# Patient Record
Sex: FEMALE | Race: WHITE | NOT HISPANIC OR LATINO | Employment: OTHER | URBAN - METROPOLITAN AREA
[De-identification: names, ages, dates, MRNs, and addresses within clinical notes are randomized per-mention and may not be internally consistent; named-entity substitution may affect disease eponyms.]

---

## 2021-11-16 ENCOUNTER — OFFICE VISIT (OUTPATIENT)
Dept: FAMILY MEDICINE | Facility: CLINIC | Age: 41
End: 2021-11-16
Payer: OTHER GOVERNMENT

## 2021-11-16 VITALS
WEIGHT: 177 LBS | SYSTOLIC BLOOD PRESSURE: 110 MMHG | BODY MASS INDEX: 32.57 KG/M2 | DIASTOLIC BLOOD PRESSURE: 76 MMHG | HEIGHT: 62 IN | HEART RATE: 80 BPM

## 2021-11-16 DIAGNOSIS — Z12.31 ENCOUNTER FOR SCREENING MAMMOGRAM FOR MALIGNANT NEOPLASM OF BREAST: ICD-10-CM

## 2021-11-16 DIAGNOSIS — Z13.29 SCREENING FOR ENDOCRINE DISORDER: ICD-10-CM

## 2021-11-16 DIAGNOSIS — Z00.00 ANNUAL PHYSICAL EXAM: Primary | ICD-10-CM

## 2021-11-16 DIAGNOSIS — Z30.016 ENCOUNTER FOR INITIAL PRESCRIPTION OF TRANSDERMAL PATCH HORMONAL CONTRACEPTIVE DEVICE: ICD-10-CM

## 2021-11-16 DIAGNOSIS — Z13.0 SCREENING FOR DEFICIENCY ANEMIA: ICD-10-CM

## 2021-11-16 DIAGNOSIS — Z76.89 ESTABLISHING CARE WITH NEW DOCTOR, ENCOUNTER FOR: Primary | ICD-10-CM

## 2021-11-16 DIAGNOSIS — Z13.6 SCREENING FOR ISCHEMIC HEART DISEASE (IHD): ICD-10-CM

## 2021-11-16 PROCEDURE — 99203 PR OFFICE/OUTPT VISIT, NEW, LEVL III, 30-44 MIN: ICD-10-PCS | Mod: S$GLB,,, | Performed by: FAMILY MEDICINE

## 2021-11-16 PROCEDURE — 99203 OFFICE O/P NEW LOW 30 MIN: CPT | Mod: S$GLB,,, | Performed by: FAMILY MEDICINE

## 2021-11-16 RX ORDER — NORELGESTROMIN AND ETHINYL ESTRADIOL 35; 150 UG/MG; UG/MG
1 PATCH TRANSDERMAL WEEKLY
Qty: 3 PATCH | Refills: 7 | Status: SHIPPED | OUTPATIENT
Start: 2021-11-16 | End: 2021-12-13 | Stop reason: SDUPTHER

## 2021-11-16 RX ORDER — NORELGESTROMIN AND ETHINYL ESTRADIOL 35; 150 UG/MG; UG/MG
1 PATCH TRANSDERMAL WEEKLY
COMMUNITY
End: 2021-11-16 | Stop reason: SDUPTHER

## 2021-12-01 ENCOUNTER — HOSPITAL ENCOUNTER (OUTPATIENT)
Dept: RADIOLOGY | Facility: HOSPITAL | Age: 41
Discharge: HOME | End: 2021-12-01
Attending: FAMILY MEDICINE
Payer: OTHER GOVERNMENT

## 2021-12-01 DIAGNOSIS — Z12.31 ENCOUNTER FOR SCREENING MAMMOGRAM FOR MALIGNANT NEOPLASM OF BREAST: ICD-10-CM

## 2021-12-01 PROCEDURE — 77067 SCR MAMMO BI INCL CAD: CPT | Mod: TC,PO

## 2021-12-11 DIAGNOSIS — Z30.016 ENCOUNTER FOR INITIAL PRESCRIPTION OF TRANSDERMAL PATCH HORMONAL CONTRACEPTIVE DEVICE: ICD-10-CM

## 2021-12-13 DIAGNOSIS — Z30.016 ENCOUNTER FOR INITIAL PRESCRIPTION OF TRANSDERMAL PATCH HORMONAL CONTRACEPTIVE DEVICE: ICD-10-CM

## 2021-12-13 RX ORDER — NORELGESTROMIN AND ETHINYL ESTRADIOL 35; 150 UG/MG; UG/MG
1 PATCH TRANSDERMAL WEEKLY
Qty: 3 PATCH | Refills: 7 | Status: SHIPPED | OUTPATIENT
Start: 2021-12-13 | End: 2022-02-03 | Stop reason: SDUPTHER

## 2022-01-04 RX ORDER — NORELGESTROMIN AND ETHINYL ESTRADIOL 150; 35 UG/D; UG/D
PATCH TRANSDERMAL
Refills: 0 | OUTPATIENT
Start: 2022-01-04

## 2022-02-03 DIAGNOSIS — Z30.016 ENCOUNTER FOR INITIAL PRESCRIPTION OF TRANSDERMAL PATCH HORMONAL CONTRACEPTIVE DEVICE: ICD-10-CM

## 2022-02-03 RX ORDER — NORELGESTROMIN AND ETHINYL ESTRADIOL 35; 150 UG/MG; UG/MG
1 PATCH TRANSDERMAL WEEKLY
Qty: 3 PATCH | Refills: 7 | Status: SHIPPED | OUTPATIENT
Start: 2022-02-03 | End: 2022-02-07 | Stop reason: SDUPTHER

## 2022-02-03 NOTE — TELEPHONE ENCOUNTER
----- Message from Lizz Carey sent at 2/3/2022  9:22 AM CST -----  Pt calling for refill on her birth control patches to [facility]  # [phone]

## 2022-02-07 DIAGNOSIS — Z30.016 ENCOUNTER FOR INITIAL PRESCRIPTION OF TRANSDERMAL PATCH HORMONAL CONTRACEPTIVE DEVICE: ICD-10-CM

## 2022-02-07 RX ORDER — NORELGESTROMIN AND ETHINYL ESTRADIOL 35; 150 UG/MG; UG/MG
1 PATCH TRANSDERMAL WEEKLY
Qty: 3 PATCH | Refills: 7 | Status: SHIPPED | OUTPATIENT
Start: 2022-02-07 | End: 2022-07-18 | Stop reason: SDUPTHER

## 2022-02-07 NOTE — TELEPHONE ENCOUNTER
----- Message from Lizzmel Carey sent at 2/7/2022 10:04 AM CST -----  Pt callling for Birth Control Patches refill said it was requested last week she can no longer use [facility] due to her  insurance so she needs this this to go to [facility] and will be using [facility] [address] for local fills. I did update the chart.  [phone]

## 2022-05-03 ENCOUNTER — TELEPHONE (OUTPATIENT)
Dept: FAMILY MEDICINE | Facility: CLINIC | Age: 42
End: 2022-05-03

## 2022-05-03 NOTE — TELEPHONE ENCOUNTER
----- Message from Lizz Carey sent at 5/3/2022  8:33 AM CDT -----  Pt calling said she is having left shoulder pain would like to be seen in the afternoon anytime this week [phone]

## 2022-05-04 ENCOUNTER — TELEPHONE (OUTPATIENT)
Dept: FAMILY MEDICINE | Facility: CLINIC | Age: 42
End: 2022-05-04

## 2022-07-18 DIAGNOSIS — Z30.016 ENCOUNTER FOR INITIAL PRESCRIPTION OF TRANSDERMAL PATCH HORMONAL CONTRACEPTIVE DEVICE: ICD-10-CM

## 2022-07-18 RX ORDER — NORELGESTROMIN AND ETHINYL ESTRADIOL 35; 150 UG/MG; UG/MG
1 PATCH TRANSDERMAL WEEKLY
Qty: 9 PATCH | Refills: 3 | Status: SHIPPED | OUTPATIENT
Start: 2022-07-18 | End: 2023-06-29 | Stop reason: SDUPTHER

## 2022-07-18 NOTE — TELEPHONE ENCOUNTER
----- Message from Loni Cornell sent at 7/18/2022 11:30 AM CDT -----  Refill for estradiol patch. Qty 9 patches for 90 days with 3 refills. [facility]. Pt #[phone]

## 2023-03-15 ENCOUNTER — TELEPHONE (OUTPATIENT)
Dept: FAMILY MEDICINE | Facility: CLINIC | Age: 43
End: 2023-03-15

## 2023-03-15 DIAGNOSIS — Z12.31 OTHER SCREENING MAMMOGRAM: ICD-10-CM

## 2023-03-15 NOTE — TELEPHONE ENCOUNTER
----- Message from Lizett Hubbard sent at 3/15/2023  2:42 PM CDT -----  Pt returning a phone call. [phone]     Work Number is [phone] ext [phone] just in case she does not answer cell

## 2023-03-15 NOTE — TELEPHONE ENCOUNTER
----- Message from Lizett Hubbard sent at 3/15/2023 11:32 AM CDT -----  Pt would like to get an appt to discuss weight loss options. [phone]

## 2023-03-22 ENCOUNTER — HOSPITAL ENCOUNTER (OUTPATIENT)
Dept: RADIOLOGY | Facility: CLINIC | Age: 43
Discharge: HOME | End: 2023-03-22
Attending: FAMILY MEDICINE
Payer: OTHER GOVERNMENT

## 2023-03-22 DIAGNOSIS — Z12.31 OTHER SCREENING MAMMOGRAM: ICD-10-CM

## 2023-03-22 PROCEDURE — 77063 MAMMO DIGITAL SCREENING BILAT WITH TOMO: ICD-10-PCS | Mod: 26,,, | Performed by: RADIOLOGY

## 2023-03-22 PROCEDURE — 77063 BREAST TOMOSYNTHESIS BI: CPT | Mod: 26,,, | Performed by: RADIOLOGY

## 2023-03-22 PROCEDURE — 77067 MAMMO DIGITAL SCREENING BILAT WITH TOMO: ICD-10-PCS | Mod: 26,,, | Performed by: RADIOLOGY

## 2023-03-22 PROCEDURE — 77067 SCR MAMMO BI INCL CAD: CPT | Mod: TC,PO

## 2023-03-22 PROCEDURE — 77067 SCR MAMMO BI INCL CAD: CPT | Mod: 26,,, | Performed by: RADIOLOGY

## 2023-04-11 ENCOUNTER — PATIENT MESSAGE (OUTPATIENT)
Dept: ADMINISTRATIVE | Facility: HOSPITAL | Age: 43
End: 2023-04-11
Payer: OTHER GOVERNMENT

## 2023-06-29 DIAGNOSIS — Z30.016 ENCOUNTER FOR INITIAL PRESCRIPTION OF TRANSDERMAL PATCH HORMONAL CONTRACEPTIVE DEVICE: ICD-10-CM

## 2023-06-29 NOTE — TELEPHONE ENCOUNTER
----- Message from Lizett Shea MA sent at 6/29/2023  2:54 PM CDT -----  Regarding: refill  2nd request for ortho evra birth control patch. Please have np or dr. Mitchell approve if dr. Mcfadden is still out.  [facility]  [phone]

## 2023-06-29 NOTE — TELEPHONE ENCOUNTER
Spoke with patient visit scheduled.  Requests refill on patch.  Informed no visit since 2021.  Pending MD approval -NISSA

## 2023-06-30 RX ORDER — NORELGESTROMIN AND ETHINYL ESTRADIOL 35; 150 UG/MG; UG/MG
1 PATCH TRANSDERMAL WEEKLY
Qty: 9 PATCH | Refills: 3 | Status: SHIPPED | OUTPATIENT
Start: 2023-06-30 | End: 2023-11-01 | Stop reason: SDUPTHER

## 2023-06-30 NOTE — TELEPHONE ENCOUNTER
prescription sent to   [facility] - [place], [place] - [address] [place] [id number]  Phone: [phone] Fax: [phone]

## 2023-08-15 ENCOUNTER — TELEPHONE (OUTPATIENT)
Dept: FAMILY MEDICINE | Facility: CLINIC | Age: 43
End: 2023-08-15

## 2023-08-15 NOTE — TELEPHONE ENCOUNTER
----- Message from Viet Fang sent at 8/14/2023 10:39 AM CDT -----  [facility]  [phone]

## 2023-08-17 ENCOUNTER — TELEPHONE (OUTPATIENT)
Dept: FAMILY MEDICINE | Facility: CLINIC | Age: 43
End: 2023-08-17

## 2023-08-17 NOTE — TELEPHONE ENCOUNTER
----- Message from Estela Chandler sent at 8/17/2023  9:47 AM CDT -----  FYI : patient called and stated that she was returning a call to the nurse to reschedule the appointment,contacted the nurse Laura and she advised to put the call through. No patient call back needed for this message,

## 2023-09-28 ENCOUNTER — OCCUPATIONAL HEALTH (OUTPATIENT)
Dept: URGENT CARE | Facility: CLINIC | Age: 43
End: 2023-09-28

## 2023-09-28 PROCEDURE — 80305 DRUG TEST PRSMV DIR OPT OBS: CPT | Mod: S$GLB,,, | Performed by: EMERGENCY MEDICINE

## 2023-09-28 PROCEDURE — 80305 PR COLLECTION ONLY DRUG SCREEN: ICD-10-PCS | Mod: S$GLB,,, | Performed by: EMERGENCY MEDICINE

## 2023-11-01 ENCOUNTER — OFFICE VISIT (OUTPATIENT)
Dept: FAMILY MEDICINE | Facility: CLINIC | Age: 43
End: 2023-11-01
Attending: FAMILY MEDICINE
Payer: OTHER GOVERNMENT

## 2023-11-01 VITALS
BODY MASS INDEX: 30.36 KG/M2 | WEIGHT: 165 LBS | HEART RATE: 66 BPM | SYSTOLIC BLOOD PRESSURE: 122 MMHG | OXYGEN SATURATION: 99 % | HEIGHT: 62 IN | DIASTOLIC BLOOD PRESSURE: 84 MMHG

## 2023-11-01 DIAGNOSIS — Z30.016 ENCOUNTER FOR INITIAL PRESCRIPTION OF TRANSDERMAL PATCH HORMONAL CONTRACEPTIVE DEVICE: ICD-10-CM

## 2023-11-01 DIAGNOSIS — Z01.419 WELL WOMAN EXAM WITH ROUTINE GYNECOLOGICAL EXAM: Primary | ICD-10-CM

## 2023-11-01 PROCEDURE — 99396 PR PREVENTIVE VISIT,EST,40-64: ICD-10-PCS | Mod: S$GLB,,, | Performed by: FAMILY MEDICINE

## 2023-11-01 PROCEDURE — 99396 PREV VISIT EST AGE 40-64: CPT | Mod: S$GLB,,, | Performed by: FAMILY MEDICINE

## 2023-11-01 RX ORDER — NORELGESTROMIN AND ETHINYL ESTRADIOL 35; 150 UG/MG; UG/MG
1 PATCH TRANSDERMAL WEEKLY
Qty: 9 PATCH | Refills: 3 | Status: SHIPPED | OUTPATIENT
Start: 2023-11-01

## 2023-11-01 NOTE — PROGRESS NOTES
SUBJECTIVE:   HPI: Camila Pratt  is a 43 y.o. female who presents for annual physical .   Regular Check Up and declined flu vaccination    Patient presents for her annual exam and cervical cancer screening.  She also continues to use birth control patch for contraception.  Her cycles are regular.  She is currently starting her cycle today.  She has no history of abnormal Pap smears.  She is a G21.  She has a family history of hypertension and diabetes.  No recent surgeries or procedures.  She is up-to-date with dental screening.  She is up-to-date with mammogram.  She will be due colon cancer screening on next year.  She currently declines immunizations      (Not in a hospital admission)    Review of patient's allergies indicates:  No Known Allergies  Current Outpatient Medications on File Prior to Visit   Medication Sig Dispense Refill    [DISCONTINUED] norelgestromin-ethinyl estradiol 150-35 mcg/24 hr Place 1 patch onto the skin once a week. 9 patch 3     No current facility-administered medications on file prior to visit.     Past Medical History:   Diagnosis Date    Anxiety     Arthritis      Past Surgical History:   Procedure Laterality Date     SECTION       Family History   Problem Relation Age of Onset    Fibromyalgia Mother     Diabetes Mellitus Father     Hypertension Father      Social History     Tobacco Use    Smoking status: Never    Smokeless tobacco: Never   Substance Use Topics    Alcohol use: Yes     Comment: occ      Health Maintenance Topics with due status: Not Due       Topic Last Completion Date    Mammogram 2023     Immunization History   Administered Date(s) Administered    Anthrax 2003, 2014, 2018    COVID-19, MRNA, LN-S, PF (MODERNA FULL 0.5 ML DOSE) 2021, 2021, 2021    Encephalitis - IM 2016, 2019    Hepatitis A, Adult 2000, 2003, 2004    Hepatitis B, Adult 2003, 2004, 2005    IPV  "11/08/2000, 11/08/2000    Influenza (Flumist) - Quadrivalent - Intranasal *Preferred* (2-49 years old) 12/07/2013    Influenza - Intranasal - Trivalent 02/19/2008, 10/01/2009, 11/16/2010, 11/17/2010, 10/05/2011, 10/14/2011, 10/04/2012, 11/13/2015    Influenza - Quadrivalent 11/25/2014    Influenza - Quadrivalent - PF *Preferred* (6 months and older) 11/18/2017, 10/14/2018, 12/10/2019, 12/04/2020    Influenza - Trivalent - Recombinant - PF 11/03/2016    Influenza A (H1N1) 2009 Monovalent - IM 01/13/2010    MMR 11/08/2000, 11/02/2018    Meningococcal Conjugate (MCV4P) 11/08/2000    Meningococcal Polysaccharide (23-Valent) (MPSV4) 11/08/2000    PPD Test 11/01/2000, 02/19/2008    Td (ADULT) 11/08/2000, 11/08/2000    Tdap 03/11/2011    Typhoid - ViCPs 12/01/2003, 02/19/2008, 10/18/2012, 10/18/2012, 03/22/2016, 11/02/2018    Yellow Fever 11/08/2000, 11/08/2000, 11/17/2012    vaccinia (smallpox) 02/08/2003, 04/16/2014       Review of Systems   Constitutional:  Negative for activity change, fatigue and unexpected weight change.   HENT:  Negative for hearing loss, postnasal drip, sinus pressure, sore throat and voice change.    Eyes:  Negative for photophobia and visual disturbance.   Respiratory:  Negative for cough, shortness of breath and wheezing.    Cardiovascular:  Negative for chest pain and palpitations.   Gastrointestinal:  Negative for constipation, diarrhea and nausea.   Genitourinary:  Negative for difficulty urinating, frequency, hematuria and urgency.   Musculoskeletal:  Negative for arthralgias and back pain.   Skin:  Negative for rash.   Neurological:  Negative for weakness, light-headedness and headaches.   Hematological:  Negative for adenopathy. Does not bruise/bleed easily.   Psychiatric/Behavioral:  The patient is not nervous/anxious.       OBJECTIVE:      Vitals:    11/01/23 1613   BP: 122/84   Pulse: 66   SpO2: 99%   Weight: 74.8 kg (165 lb)   Height: 5' 2" (1.575 m)     Physical Exam  Vitals reviewed. "   Constitutional:       General: She is not in acute distress.     Appearance: Normal appearance. She is well-developed.   HENT:      Head: Normocephalic and atraumatic.      Right Ear: External ear normal.      Left Ear: External ear normal.      Nose: Nose normal.      Mouth/Throat:      Mouth: Mucous membranes are moist.   Eyes:      General: Lids are normal.      Conjunctiva/sclera: Conjunctivae normal.      Pupils: Pupils are equal, round, and reactive to light.   Neck:      Thyroid: No thyromegaly.      Vascular: No JVD.      Trachea: No tracheal deviation.   Cardiovascular:      Rate and Rhythm: Normal rate and regular rhythm.      Chest Wall: PMI is not displaced.      Pulses: Normal pulses.      Heart sounds: Normal heart sounds.   Pulmonary:      Effort: Pulmonary effort is normal.      Breath sounds: Normal breath sounds.   Chest:   Breasts:     Leonard Score is 5.      Right: Normal.      Left: Normal.   Abdominal:      General: Bowel sounds are normal.      Palpations: Abdomen is soft.      Tenderness: There is no abdominal tenderness. There is no guarding or rebound.   Genitourinary:     Labia:         Right: No rash.         Left: No rash.       Vagina: Normal.      Cervix: Cervical bleeding present.      Uterus: Normal.       Adnexa: Right adnexa normal and left adnexa normal.   Musculoskeletal:         General: No tenderness. Normal range of motion.      Cervical back: Full passive range of motion without pain and neck supple.   Skin:     General: Skin is warm and dry.      Findings: No rash.   Neurological:      General: No focal deficit present.      Mental Status: She is alert and oriented to person, place, and time.   Psychiatric:         Mood and Affect: Mood normal.         Behavior: Behavior normal.        Assessment:       1. Well woman exam with routine gynecological exam    2. Encounter for initial prescription of transdermal patch hormonal contraceptive device        Plan:       Well woman  exam with routine gynecological exam  -     ThinPrep Img Pap/HPV mRNA E6/E7 Huong Leon    Encounter for initial prescription of transdermal patch hormonal contraceptive device  -     norelgestromin-ethinyl estradiol 150-35 mcg/24 hr; Place 1 patch onto the skin once a week.  Dispense: 9 patch; Refill: 3        Counseled on age and gender appropriate medical preventative services, including cancer screenings, immunizations, overall nutritional health, need for a consistent exercise regimen and an overall push towards maintaining a vigorous and active lifestyle.      Follow up in about 1 year (around 11/1/2024) for Annual Physical.

## 2023-11-07 LAB
C TRACH RRNA SPEC QL NAA+PROBE: NOT DETECTED
CLINICAL INFO: NORMAL
COMMENT: NORMAL
COMMENT: NORMAL
CYTO CVX: NORMAL
CYTOLOGIST CVX/VAG CYTO: NORMAL
CYTOLOGY CMNT CVX/VAG CYTO-IMP: NORMAL
DATE OF PREVIOUS PAP: NORMAL
DATE PREVIOUS BX: NORMAL
GEN CATEG CVX/VAG CYTO-IMP: NORMAL
HPV E6+E7 MRNA CVX QL NAA+PROBE: NOT DETECTED
LMP START DATE: NORMAL
N GONORRHOEA RRNA SPEC QL NAA+PROBE: NOT DETECTED
SPECIMEN SOURCE CVX/VAG CYTO: NORMAL
STAT OF ADQ CVX/VAG CYTO-IMP: NORMAL

## 2024-04-17 DIAGNOSIS — Z12.31 OTHER SCREENING MAMMOGRAM: ICD-10-CM

## 2024-04-22 ENCOUNTER — PATIENT MESSAGE (OUTPATIENT)
Dept: ADMINISTRATIVE | Facility: HOSPITAL | Age: 44
End: 2024-04-22
Payer: OTHER GOVERNMENT

## 2024-06-17 ENCOUNTER — OCCUPATIONAL HEALTH (OUTPATIENT)
Dept: URGENT CARE | Facility: CLINIC | Age: 44
End: 2024-06-17

## 2024-06-17 DIAGNOSIS — Z00.00 ROUTINE GENERAL MEDICAL EXAMINATION AT A HEALTH CARE FACILITY: Primary | ICD-10-CM

## 2024-06-17 PROCEDURE — 80305 DRUG TEST PRSMV DIR OPT OBS: CPT | Mod: S$GLB,,, | Performed by: EMERGENCY MEDICINE

## 2024-08-07 ENCOUNTER — PATIENT MESSAGE (OUTPATIENT)
Dept: FAMILY MEDICINE | Facility: CLINIC | Age: 44
End: 2024-08-07
Payer: OTHER GOVERNMENT

## 2024-10-07 ENCOUNTER — HOSPITAL ENCOUNTER (OUTPATIENT)
Dept: RADIOLOGY | Facility: HOSPITAL | Age: 44
Discharge: HOME | End: 2024-10-07
Attending: FAMILY MEDICINE
Payer: OTHER GOVERNMENT

## 2024-10-07 VITALS — HEIGHT: 62 IN | WEIGHT: 165 LBS | BODY MASS INDEX: 30.36 KG/M2

## 2024-10-07 DIAGNOSIS — Z12.31 OTHER SCREENING MAMMOGRAM: ICD-10-CM

## 2024-10-07 PROCEDURE — 77063 BREAST TOMOSYNTHESIS BI: CPT | Mod: 26,,, | Performed by: RADIOLOGY

## 2024-10-07 PROCEDURE — 77067 SCR MAMMO BI INCL CAD: CPT | Mod: 26,,, | Performed by: RADIOLOGY

## 2024-10-07 PROCEDURE — 77067 SCR MAMMO BI INCL CAD: CPT | Mod: TC,PO

## 2025-02-05 ENCOUNTER — OFFICE VISIT (OUTPATIENT)
Dept: FAMILY MEDICINE | Facility: CLINIC | Age: 45
End: 2025-02-05
Payer: OTHER GOVERNMENT

## 2025-02-05 ENCOUNTER — PATIENT MESSAGE (OUTPATIENT)
Dept: GASTROENTEROLOGY | Facility: CLINIC | Age: 45
End: 2025-02-05
Payer: OTHER GOVERNMENT

## 2025-02-05 VITALS
SYSTOLIC BLOOD PRESSURE: 118 MMHG | HEART RATE: 76 BPM | HEIGHT: 62 IN | WEIGHT: 179 LBS | DIASTOLIC BLOOD PRESSURE: 78 MMHG | RESPIRATION RATE: 18 BRPM | OXYGEN SATURATION: 99 % | BODY MASS INDEX: 32.94 KG/M2

## 2025-02-05 DIAGNOSIS — Z13.6 SCREENING FOR ISCHEMIC HEART DISEASE (IHD): ICD-10-CM

## 2025-02-05 DIAGNOSIS — E66.811 CLASS 1 OBESITY DUE TO EXCESS CALORIES WITHOUT SERIOUS COMORBIDITY WITH BODY MASS INDEX (BMI) OF 32.0 TO 32.9 IN ADULT: ICD-10-CM

## 2025-02-05 DIAGNOSIS — Z00.00 ANNUAL PHYSICAL EXAM: Primary | ICD-10-CM

## 2025-02-05 DIAGNOSIS — Z30.016 ENCOUNTER FOR INITIAL PRESCRIPTION OF TRANSDERMAL PATCH HORMONAL CONTRACEPTIVE DEVICE: ICD-10-CM

## 2025-02-05 DIAGNOSIS — Z13.0 SCREENING FOR ENDOCRINE, METABOLIC AND IMMUNITY DISORDER: ICD-10-CM

## 2025-02-05 DIAGNOSIS — Z12.11 COLON CANCER SCREENING: ICD-10-CM

## 2025-02-05 DIAGNOSIS — E66.09 CLASS 1 OBESITY DUE TO EXCESS CALORIES WITHOUT SERIOUS COMORBIDITY WITH BODY MASS INDEX (BMI) OF 32.0 TO 32.9 IN ADULT: ICD-10-CM

## 2025-02-05 DIAGNOSIS — Z13.228 SCREENING FOR ENDOCRINE, METABOLIC AND IMMUNITY DISORDER: ICD-10-CM

## 2025-02-05 DIAGNOSIS — Z13.29 SCREENING FOR ENDOCRINE, METABOLIC AND IMMUNITY DISORDER: ICD-10-CM

## 2025-02-05 PROCEDURE — 99396 PREV VISIT EST AGE 40-64: CPT | Mod: S$GLB,,, | Performed by: FAMILY MEDICINE

## 2025-02-05 RX ORDER — NORELGESTROMIN AND ETHINYL ESTRADIOL 35; 150 UG/MG; UG/MG
1 PATCH TRANSDERMAL WEEKLY
Qty: 9 PATCH | Refills: 3 | Status: SHIPPED | OUTPATIENT
Start: 2025-02-05

## 2025-02-05 NOTE — PROGRESS NOTES
SUBJECTIVE:   HPI: Camila Pratt  is a 45 y.o. female who presents for annual physical .   Annual Exam (No bottles//refills needed// pt states she's concerned of recent weight gain//[facility] )    History of Present Illness    CHIEF COMPLAINT:  Patient presents today for annual visit    PREVENTIVE HEALTH:  Last PAP smear in  was normal. Last mammogram was completed in October. Last eye exam was in 2022. She maintains regular yearly dental visits.    WEIGHT MANAGEMENT:  She expresses difficulty with weight loss despite various attempts and requests comprehensive testing to investigate potential underlying causes.    FAMILY HISTORY:  Family history is positive for diabetes and negative for colon cancer.    MEDICATIONS:  She denies taking any medications.         (Not in a hospital admission)    Review of patient's allergies indicates:  No Known Allergies  Current Outpatient Medications on File Prior to Visit   Medication Sig Dispense Refill    [DISCONTINUED] norelgestromin-ethinyl estradiol 150-35 mcg/24 hr Place 1 patch onto the skin once a week. 9 patch 3     No current facility-administered medications on file prior to visit.     Past Medical History:   Diagnosis Date    Anxiety     Arthritis      Past Surgical History:   Procedure Laterality Date     SECTION       Family History   Problem Relation Name Age of Onset    Fibromyalgia Mother      Diabetes Mellitus Father      Hypertension Father       Social History     Tobacco Use    Smoking status: Never    Smokeless tobacco: Never   Substance Use Topics    Alcohol use: Yes     Comment: occ      Health Maintenance Topics with due status: Not Due       Topic Last Completion Date    Cervical Cancer Screening 2023    Mammogram 10/07/2024    RSV Vaccine (Age 60+ and Pregnant patients) Not Due     Immunization History   Administered Date(s) Administered    Anthrax 2003,  04/16/2014, 11/02/2018    COVID-19, MRNA, LN-S, PF (MODERNA FULL 0.5 ML DOSE) 03/02/2021, 03/30/2021, 12/18/2021    Encephalitis - IM 03/22/2016, 04/24/2019    Hepatitis A, Adult 11/08/2000, 06/05/2003, 08/03/2004    Hepatitis B, Adult 06/05/2003, 08/03/2004, 03/16/2005    IPV 11/08/2000, 11/08/2000    Influenza (Flumist) - Quadrivalent - Intranasal *Preferred* (2-49 years old) 12/07/2013    Influenza - Intranasal 10/14/2011    Influenza - Intranasal - Trivalent 02/19/2008, 10/01/2009, 11/16/2010, 11/17/2010, 10/05/2011, 10/04/2012, 11/13/2015    Influenza - Quadrivalent 11/25/2014    Influenza - Quadrivalent - PF *Preferred* (6 months and older) 11/18/2017, 10/14/2018, 12/10/2019, 12/04/2020    Influenza - Trivalent - Flublok - PF (18 years and older) 11/03/2016    Influenza A (H1N1) 2009 Monovalent - IM 01/13/2010    MMR 11/08/2000, 11/02/2018    Meningococcal Conjugate (MCV4P) 11/08/2000    Meningococcal Polysaccharide (23-Valent) (MPSV4) 11/08/2000    PPD Test 11/01/2000, 02/19/2008    Td (ADULT) 11/08/2000, 11/08/2000    Tdap 03/11/2011    Typhoid - ViCPs 12/01/2003, 02/19/2008, 10/18/2012, 10/18/2012, 03/22/2016, 11/02/2018    Yellow Fever 11/08/2000, 11/08/2000, 11/17/2012    vaccinia (smallpox) 02/08/2003, 04/16/2014       Review of Systems   Constitutional:  Positive for activity change and unexpected weight change.   HENT:  Negative for hearing loss, rhinorrhea and trouble swallowing.    Eyes:  Negative for discharge and visual disturbance.   Respiratory:  Negative for chest tightness and wheezing.    Cardiovascular:  Negative for chest pain and palpitations.   Gastrointestinal:  Negative for blood in stool, constipation, diarrhea and vomiting.   Endocrine: Negative for polydipsia and polyuria.   Genitourinary:  Negative for difficulty urinating, dysuria, hematuria and menstrual problem.   Musculoskeletal:  Negative for arthralgias, joint swelling and neck pain.   Neurological:  Negative for weakness and  "headaches.   Psychiatric/Behavioral:  Negative for confusion and dysphoric mood.       OBJECTIVE:          10/7/2024     7:05 AM 2/5/2025    11:28 AM   Vitals - 1 value per visit   SYSTOLIC  118   DIASTOLIC  78   Pulse  76   Resp  18   SPO2  99 %   Weight (lb) 165 179   Weight (kg) 74.844 81.194   Height 5' 2" (1.575 m) 5' 2" (1.575 m)   BMI (Calculated) 30.2 32.7   Pain Score  Zero      Physical Exam  Constitutional:       Appearance: Normal appearance.   HENT:      Head: Normocephalic and atraumatic.      Mouth/Throat:      Mouth: Mucous membranes are moist.   Eyes:      Conjunctiva/sclera: Conjunctivae normal.   Cardiovascular:      Rate and Rhythm: Normal rate and regular rhythm.      Heart sounds: Normal heart sounds.   Pulmonary:      Effort: Pulmonary effort is normal.      Breath sounds: Normal breath sounds.   Neurological:      General: No focal deficit present.      Mental Status: She is alert and oriented to person, place, and time.   Psychiatric:         Mood and Affect: Mood normal.         Behavior: Behavior normal.          Assessment:       Assessment & Plan    IMPRESSION:  - Conducted annual visit for 45-year-old patient  - Reviewed patient's history, [facility]  - Assessed need for colon cancer screening, considering patient's age and previous screening  - Evaluated birth control needs  - Considered thyroid testing as part of weight loss difficulty investigation    ANNUAL HEALTH ASSESSMENT:  - Conducted annual visit for the patient, reviewing previous care and medical history.  - Noted that the [facility] between [facility].  - Assessed the patient's overall health and need for screenings.  - Recommend annual or biennial visits based on health status.  - Instructed the patient to follow up in 1-2 years for next annual visit if labs are normal.    CERVICAL CANCER SCREENING:  - Confirmed previous PAP test was normal.    LABS:  - Ordered screening labs " including CBC, Metabolic panel, Thyroid function test, cholesterol, and diabetes screen.    WEIGHT MANAGEMENT:  - Noted the patient's report of difficulty in losing weight.  - Acknowledged age-related changes in metabolism affecting weight loss.  - Educated about age-related changes in metabolism and weight loss challenges after 40.    THYROID DISORDER:  - Ordered thyroid test as part of weight loss investigation.    BREAST CANCER SCREENING:  - Confirmed patient had a mammogram in October.  - Noted previous mammogram results were normal.    COLORECTAL CANCER SCREENING:  - Noted patient's report of [facility].  - Explained difference between annual Fecal Immunochemical Test (FIT) and triennial Cologuard for colon cancer screening.  - Discussed colonoscopy as an alternative screening method, effective for 10 years if normal.  - Referred patient for colonoscopy (medically non-urgent) as chosen screening method.  - Instructed patient to contact the office regarding colonoscopy scheduling.    BIRTH CONTROL:  - Continued birth control patches, 1 patch weekly with 1 week off per month.  - Sent prescription for birth control patches to [facility].    DIABETES SCREENING:  - Noted patient's family history of diabetes.  - Ordered diabetes screening as part of lab work.    HYPERLIPIDEMIA SCREENING:  - Ordered lipid panel for cholesterol screening.           Plan:       Annual physical exam  -     Comprehensive Metabolic Panel; Future; Expected date: 02/05/2025  -     CBC Auto Differential; Future; Expected date: 02/05/2025  -     Lipid Panel; Future; Expected date: 02/05/2025  -     Hemoglobin A1C; Future; Expected date: 02/05/2025  -     TSH; Future; Expected date: 02/05/2025    Encounter for initial prescription of transdermal patch hormonal contraceptive device  -     norelgestromin-ethinyl estradiol 150-35 mcg/24 hr; Place 1 patch onto the skin once a week.  Dispense: 9 patch; Refill: 3    Colon  cancer screening  -     Case Request Endoscopy: COLONOSCOPY    Screening for ischemic heart disease (IHD)    Screening for endocrine, metabolic and immunity disorder  -     Comprehensive Metabolic Panel; Future; Expected date: 02/05/2025  -     CBC Auto Differential; Future; Expected date: 02/05/2025  -     Lipid Panel; Future; Expected date: 02/05/2025  -     Hemoglobin A1C; Future; Expected date: 02/05/2025  -     TSH; Future; Expected date: 02/05/2025    Class 1 obesity due to excess calories without serious comorbidity with body mass index (BMI) of 32.0 to 32.9 in adult        Counseled on age and gender appropriate medical preventative services, including cancer screenings, immunizations, overall nutritional health, need for a consistent exercise regimen and an overall push towards maintaining a vigorous and active lifestyle.      Follow up in about 1 year (around 2/5/2026) for Annual Physical.        This note was generated with the assistance of ambient listening technology. Verbal consent was obtained by the patient and accompanying visitor(s) for the recording of patient appointment to facilitate this note. I attest to having reviewed and edited the generated note for accuracy, though some syntax or spelling errors may persist. Please contact the author of this note for any clarification.

## 2025-02-06 LAB
ALBUMIN SERPL-MCNC: 4.4 G/DL (ref 3.6–5.1)
ALBUMIN/GLOB SERPL: 1.5 (CALC) (ref 1–2.5)
ALP SERPL-CCNC: 75 U/L (ref 31–125)
ALT SERPL-CCNC: 13 U/L (ref 6–29)
AST SERPL-CCNC: 13 U/L (ref 10–35)
BASOPHILS # BLD AUTO: 149 CELLS/UL (ref 0–200)
BASOPHILS NFR BLD AUTO: 1.2 %
BILIRUB SERPL-MCNC: 0.3 MG/DL (ref 0.2–1.2)
BUN SERPL-MCNC: 13 MG/DL (ref 7–25)
BUN/CREAT SERPL: NORMAL (CALC) (ref 6–22)
CALCIUM SERPL-MCNC: 9.3 MG/DL (ref 8.6–10.2)
CHLORIDE SERPL-SCNC: 102 MMOL/L (ref 98–110)
CHOLEST SERPL-MCNC: 182 MG/DL
CHOLEST/HDLC SERPL: 2.9 (CALC)
CO2 SERPL-SCNC: 26 MMOL/L (ref 20–32)
CREAT SERPL-MCNC: 0.76 MG/DL (ref 0.5–0.99)
EGFR: 98 ML/MIN/1.73M2
EOSINOPHIL # BLD AUTO: 372 CELLS/UL (ref 15–500)
EOSINOPHIL NFR BLD AUTO: 3 %
ERYTHROCYTE [DISTWIDTH] IN BLOOD BY AUTOMATED COUNT: 11.7 % (ref 11–15)
GLOBULIN SER CALC-MCNC: 3 G/DL (CALC) (ref 1.9–3.7)
GLUCOSE SERPL-MCNC: 77 MG/DL (ref 65–99)
HBA1C MFR BLD: 5.2 % OF TOTAL HGB
HCT VFR BLD AUTO: 39.7 % (ref 35–45)
HDLC SERPL-MCNC: 63 MG/DL
HGB BLD-MCNC: 13.1 G/DL (ref 11.7–15.5)
LDLC SERPL CALC-MCNC: 95 MG/DL (CALC)
LYMPHOCYTES # BLD AUTO: 2703 CELLS/UL (ref 850–3900)
LYMPHOCYTES NFR BLD AUTO: 21.8 %
MCH RBC QN AUTO: 32 PG (ref 27–33)
MCHC RBC AUTO-ENTMCNC: 33 G/DL (ref 32–36)
MCV RBC AUTO: 97.1 FL (ref 80–100)
MONOCYTES # BLD AUTO: 868 CELLS/UL (ref 200–950)
MONOCYTES NFR BLD AUTO: 7 %
NEUTROPHILS # BLD AUTO: 8308 CELLS/UL (ref 1500–7800)
NEUTROPHILS NFR BLD AUTO: 67 %
NONHDLC SERPL-MCNC: 119 MG/DL (CALC)
PLATELET # BLD AUTO: 500 THOUSAND/UL (ref 140–400)
PMV BLD REES-ECKER: 9.3 FL (ref 7.5–12.5)
POTASSIUM SERPL-SCNC: 4.5 MMOL/L (ref 3.5–5.3)
PROT SERPL-MCNC: 7.4 G/DL (ref 6.1–8.1)
RBC # BLD AUTO: 4.09 MILLION/UL (ref 3.8–5.1)
SODIUM SERPL-SCNC: 137 MMOL/L (ref 135–146)
TRIGL SERPL-MCNC: 138 MG/DL
TSH SERPL-ACNC: 2.27 MIU/L
WBC # BLD AUTO: 12.4 THOUSAND/UL (ref 3.8–10.8)

## 2025-02-17 ENCOUNTER — TELEPHONE (OUTPATIENT)
Dept: GASTROENTEROLOGY | Facility: CLINIC | Age: 45
End: 2025-02-17
Payer: OTHER GOVERNMENT

## 2025-02-17 NOTE — TELEPHONE ENCOUNTER
----- Message from Gloria sent at 2/17/2025  8:24 AM CST -----  Regarding: Colonoscopy  Type:  Needs Medical AdviceWho Called: PtWould the patient rather a call back or a response via [facility]? [employer] Call Back Number: [phone] Information: Pt would like to reschedule colonoscopy procedure. Thanks

## 2025-03-21 ENCOUNTER — TELEPHONE (OUTPATIENT)
Dept: GASTROENTEROLOGY | Facility: CLINIC | Age: 45
End: 2025-03-21
Payer: OTHER GOVERNMENT

## 2025-03-21 NOTE — TELEPHONE ENCOUNTER
----- Message from Yogesh sent at 3/20/2025  3:14 PM CDT -----  Contact: Self  Type: Needs Medical AdviceWho Called:  Zeke Call Back Number: [phone] Information: Patient is calling to confirm her upcoming appt

## 2025-03-24 ENCOUNTER — HOSPITAL ENCOUNTER (OUTPATIENT)
Facility: HOSPITAL | Age: 45
Discharge: HOME | End: 2025-03-24
Attending: STUDENT IN AN ORGANIZED HEALTH CARE EDUCATION/TRAINING PROGRAM | Admitting: FAMILY MEDICINE
Payer: OTHER GOVERNMENT

## 2025-03-24 ENCOUNTER — ANESTHESIA (OUTPATIENT)
Dept: ENDOSCOPY | Facility: HOSPITAL | Age: 45
End: 2025-03-24
Payer: OTHER GOVERNMENT

## 2025-03-24 ENCOUNTER — ANESTHESIA EVENT (OUTPATIENT)
Dept: ENDOSCOPY | Facility: HOSPITAL | Age: 45
End: 2025-03-24
Payer: OTHER GOVERNMENT

## 2025-03-24 VITALS
RESPIRATION RATE: 20 BRPM | WEIGHT: 179 LBS | HEIGHT: 62 IN | BODY MASS INDEX: 32.94 KG/M2 | TEMPERATURE: 98 F | HEART RATE: 66 BPM | OXYGEN SATURATION: 100 % | DIASTOLIC BLOOD PRESSURE: 66 MMHG | SYSTOLIC BLOOD PRESSURE: 135 MMHG

## 2025-03-24 DIAGNOSIS — Z12.11 COLON CANCER SCREENING: ICD-10-CM

## 2025-03-24 LAB
B-HCG UR QL: NEGATIVE
CTP QC/QA: YES

## 2025-03-24 PROCEDURE — 81025 URINE PREGNANCY TEST: CPT | Performed by: STUDENT IN AN ORGANIZED HEALTH CARE EDUCATION/TRAINING PROGRAM

## 2025-03-24 PROCEDURE — 37000009 HC ANESTHESIA EA ADD 15 MINS: Performed by: STUDENT IN AN ORGANIZED HEALTH CARE EDUCATION/TRAINING PROGRAM

## 2025-03-24 PROCEDURE — G0121 COLON CA SCRN NOT HI RSK IND: HCPCS | Mod: ,,, | Performed by: STUDENT IN AN ORGANIZED HEALTH CARE EDUCATION/TRAINING PROGRAM

## 2025-03-24 PROCEDURE — 37000008 HC ANESTHESIA 1ST 15 MINUTES: Performed by: STUDENT IN AN ORGANIZED HEALTH CARE EDUCATION/TRAINING PROGRAM

## 2025-03-24 PROCEDURE — 63600175 PHARM REV CODE 636 W HCPCS: Performed by: NURSE ANESTHETIST, CERTIFIED REGISTERED

## 2025-03-24 PROCEDURE — G0121 COLON CA SCRN NOT HI RSK IND: HCPCS | Performed by: STUDENT IN AN ORGANIZED HEALTH CARE EDUCATION/TRAINING PROGRAM

## 2025-03-24 PROCEDURE — 25000003 PHARM REV CODE 250: Performed by: STUDENT IN AN ORGANIZED HEALTH CARE EDUCATION/TRAINING PROGRAM

## 2025-03-24 RX ORDER — DEXTROMETHORPHAN/PSEUDOEPHED 2.5-7.5/.8
DROPS ORAL CODE/TRAUMA/SEDATION MEDICATION
Status: COMPLETED | OUTPATIENT
Start: 2025-03-24 | End: 2025-03-24

## 2025-03-24 RX ORDER — PROPOFOL 10 MG/ML
VIAL (ML) INTRAVENOUS
Status: DISCONTINUED | OUTPATIENT
Start: 2025-03-24 | End: 2025-03-24

## 2025-03-24 RX ORDER — SODIUM CHLORIDE 9 MG/ML
INJECTION, SOLUTION INTRAVENOUS CONTINUOUS
Status: DISCONTINUED | OUTPATIENT
Start: 2025-03-24 | End: 2025-03-24 | Stop reason: HOSPADM

## 2025-03-24 RX ORDER — LIDOCAINE HYDROCHLORIDE 20 MG/ML
INJECTION INTRAVENOUS
Status: DISCONTINUED | OUTPATIENT
Start: 2025-03-24 | End: 2025-03-24

## 2025-03-24 RX ADMIN — PROPOFOL 50 MG: 10 INJECTION, EMULSION INTRAVENOUS at 08:03

## 2025-03-24 RX ADMIN — LIDOCAINE HYDROCHLORIDE 50 MG: 20 INJECTION, SOLUTION INTRAVENOUS at 08:03

## 2025-03-24 RX ADMIN — PROPOFOL 150 MG: 10 INJECTION, EMULSION INTRAVENOUS at 08:03

## 2025-03-24 RX ADMIN — SODIUM CHLORIDE: 9 INJECTION, SOLUTION INTRAVENOUS at 08:03

## 2025-03-24 NOTE — ANESTHESIA PREPROCEDURE EVALUATION
03/24/2025  Camila Pratt is a 45 y.o., female.      Pre-op Assessment    I have reviewed the NPO Status.   I have reviewed the Medications.     Review of Systems  Anesthesia Hx:  No problems with previous Anesthesia                Cardiovascular:  Exercise tolerance: good                                             Pulmonary:        Sleep Apnea     Obstructive Sleep Apnea (GABO).           Hepatic/GI:  Bowel Prep.                   Endocrine:        Obesity / BMI > 30      Physical Exam  General: Well nourished    Airway:  Mallampati: II   Mouth Opening: Normal  TM Distance: Normal  Tongue: Normal  Neck ROM: Normal ROM    Dental:  Intact    Chest/Lungs:  Clear to auscultation, Normal Respiratory Rate        Anesthesia Plan  Type of Anesthesia, risks & benefits discussed:    Anesthesia Type: Gen Natural Airway  Intra-op Monitoring Plan: Standard ASA Monitors  Induction:  IV  Informed Consent: Informed consent signed with the Patient and all parties understand the risks and agree with anesthesia plan.  All questions answered.   ASA Score: 1    Ready For Surgery From Anesthesia Perspective.     .

## 2025-03-24 NOTE — H&P
History & Physical - Short Stay  Gastroenterology      SUBJECTIVE:     Procedure: Colonoscopy    Chief Complaint/Indication for Procedure: Screening    PTA Medications   Medication Sig    norelgestromin-ethinyl estradiol 150-35 mcg/24 hr Place 1 patch onto the skin once a week.       Review of patient's allergies indicates:  No Known Allergies     Past Medical History:   Diagnosis Date    Anxiety     Arthritis     Sleep apnea, unspecified      Past Surgical History:   Procedure Laterality Date     SECTION       Family History   Problem Relation Name Age of Onset    Fibromyalgia Mother      Diabetes Mellitus Father      Hypertension Father       Social History[1]      OBJECTIVE:     Vital Signs (Most Recent)  Temp: 98.1 °F (36.7 °C) (25)  Pulse: 72 (25)  Resp: 16 (25)  BP: 110/61 (25)  SpO2: 98 % (25)    Physical Exam:                                                       GENERAL:  Comfortable, in no acute distress.                                 HEENT EXAM:  Nonicteric.  No adenopathy.  Oropharynx is clear.               NECK:  Supple.                                                               LUNGS:  Clear.                                                               CARDIAC:  Regular rate and rhythm.  S1, S2.  No murmur.                      ABDOMEN:  Soft, positive bowel sounds, nontender.  No hepatosplenomegaly or masses.  No rebound or guarding.                                             EXTREMITIES:  No edema.     MENTAL STATUS:  Normal, alert and oriented.      ASSESSMENT/PLAN:     Assessment: Screening    Plan: Colonoscopy    Anesthesia Plan: MAC    ASA Grade: ASA 2 - Patient with mild systemic disease with no functional limitations    MALLAMPATI SCORE:  I (soft palate, uvula, fauces, and tonsillar pillars visible)           [1]   Social History  Tobacco Use    Smoking status: Never    Smokeless tobacco: Never   Substance Use Topics     Alcohol use: Yes     Comment: occ    Drug use: Never

## 2025-03-24 NOTE — PLAN OF CARE
Vss, lary po fluids, denies pain, ambulates easily. IV removed, catheter intact. Discharge instructions provided and states understanding. States ready to go home.  Discharged from facility with family per wheelchair.

## 2025-03-24 NOTE — TRANSFER OF CARE
"Anesthesia Transfer of Care Note    Patient: Camila Pratt    Procedure(s) Performed: Procedure(s) (LRB):  COLONOSCOPY, SCREENING, LOW RISK PATIENT (N/A)    Patient location: GI    Anesthesia Type: general    Transport from OR: Transported from OR on room air with adequate spontaneous ventilation    Post pain: adequate analgesia    Post assessment: no apparent anesthetic complications    Post vital signs: stable    Level of consciousness: awake    Nausea/Vomiting: no nausea/vomiting    Complications: none    Transfer of care protocol was followed      Last vitals: Visit Vitals  /61 (BP Location: Left arm, Patient Position: Lying)   Pulse 72   Temp 36.7 °C (98.1 °F) (Tympanic)   Resp 16   Ht 5' 2" (1.575 m)   Wt 81.2 kg (179 lb)   SpO2 98%   Breastfeeding No   BMI 32.74 kg/m²     "

## 2025-03-24 NOTE — ANESTHESIA POSTPROCEDURE EVALUATION
Anesthesia Post Evaluation    Patient: Camila Pratt    Procedure(s) Performed: Procedure(s) (LRB):  COLONOSCOPY, SCREENING, LOW RISK PATIENT (N/A)    Final Anesthesia Type: general      Patient location during evaluation: PACU  Patient participation: Yes- Able to Participate  Level of consciousness: awake and alert and oriented  Post-procedure vital signs: reviewed and stable  Pain management: adequate  Airway patency: patent    PONV status at discharge: No PONV  Anesthetic complications: no      Cardiovascular status: blood pressure returned to baseline  Respiratory status: unassisted, spontaneous ventilation and room air  Hydration status: euvolemic  Follow-up not needed.              Vitals Value Taken Time   /66 03/24/25 09:21   Temp 36.7 °C (98.1 °F) 03/24/25 09:16   Pulse 66 03/24/25 09:21   Resp 20 03/24/25 09:21   SpO2 100 % 03/24/25 09:21         Event Time   Out of Recovery 09:30:25         Pain/Elliot Score: Elliot Score: 10 (3/24/2025  9:23 AM)

## 2025-03-24 NOTE — [employer] PATIENT INSTRUCTIONS
Discharge Summary/Instructions after an Endoscopic Procedure  Patient Name: Camila Pratt  Patient MRN: [id number]  Patient YOB: 1980 Monday, March 24, 2025  Silver Mcdaniel DO  Dear patient,  As a result of recent federal legislation (The Federal Cures Act), you may   receive lab or pathology results from your procedure in your [facility]   account before your physician is able to contact you. Your physician or   their representative will relay the results to you with their   recommendations at their soonest availability.  Thank you,  RESTRICTIONS:  During your procedure today, you received medications for sedation.  These   medications may affect your judgment, balance and coordination.  Therefore,   for 24 hours, you have the following restrictions:   - DO NOT drive a car, operate machinery, make legal/financial decisions,   sign important papers or drink alcohol.    ACTIVITY:  Today: no heavy lifting, straining or running due to procedural   sedation/anesthesia.  The following day: return to full activity including work.  DIET:  Eat and drink normally unless instructed otherwise.     TREATMENT FOR COMMON SIDE EFFECTS:  - Mild abdominal pain, nausea, belching, bloating or excessive gas:  rest,   eat lightly and use a heating pad.  - Sore Throat: treat with throat lozenges and/or gargle with warm salt   water.  - Because air was used during the procedure, expelling large amounts of air   from your rectum or belching is normal.  - If a bowel prep was taken, you may not have a bowel movement for 1-3 days.    This is normal.  SYMPTOMS TO WATCH FOR AND REPORT TO YOUR PHYSICIAN:  1. Abdominal pain or bloating, other than gas cramps.  2. Chest pain.  3. Back pain.  4. Signs of infection such as: chills or fever occurring within 24 hours   after the procedure.  5. Rectal bleeding, which would show as bright red, maroon, or black stools.   (A tablespoon of blood from the rectum is not serious, especially  if   hemorrhoids are present.)  6. Vomiting.  7. Weakness or dizziness.  GO DIRECTLY TO THE NEAREST EMERGENCY ROOM IF YOU HAVE ANY OF THE FOLLOWING:      Difficulty breathing              Chills and/or fever over 101 F   Persistent vomiting and/or vomiting blood   Severe abdominal pain   Severe chest pain   Black, tarry stools   Bleeding- more than one tablespoon   Any other symptom or condition that you feel may need urgent attention  Your doctor recommends these additional instructions:  If any biopsies were taken, your doctors clinic will contact you in 1 to 2   weeks with any results.  - Patient has a contact number available for emergencies.  The signs and   symptoms of potential delayed complications were discussed with the   patient.  Return to normal activities tomorrow.  Written discharge   instructions were provided to the patient.   - High fiber diet.   - Continue present medications.   - Repeat colonoscopy in 10 years for screening purposes.   - Return to GI clinic PRN.   - Discharge patient to home.  For questions, problems or results please call your physician - Silver Mcdaniel DO at Work:  [phone].  [facility], EMERGENCY ROOM PHONE NUMBER: [phone]  IF A COMPLICATION OR EMERGENCY SITUATION ARISES AND YOU ARE UNABLE TO REACH   YOUR PHYSICIAN - GO DIRECTLY TO THE EMERGENCY ROOM.  Silver Mcdaniel DO  3/24/2025 9:05:32 AM  This report has been verified and signed electronically.  Dear patient,  As a result of recent federal legislation (The Federal Cures Act), you may   receive lab or pathology results from your procedure in your [facility]   account before your physician is able to contact you. Your physician or   their representative will relay the results to you with their   recommendations at their soonest availability.  Thank you,  [employer]

## 2025-04-15 ENCOUNTER — OCCUPATIONAL HEALTH (OUTPATIENT)
Dept: URGENT CARE | Facility: CLINIC | Age: 45
End: 2025-04-15

## 2025-04-15 PROCEDURE — 97750 PHYSICAL PERFORMANCE TEST: CPT | Mod: S$GLB,,, | Performed by: STUDENT IN AN ORGANIZED HEALTH CARE EDUCATION/TRAINING PROGRAM

## 2025-04-15 PROCEDURE — 99499 UNLISTED E&M SERVICE: CPT | Mod: S$GLB,,, | Performed by: STUDENT IN AN ORGANIZED HEALTH CARE EDUCATION/TRAINING PROGRAM

## 2025-04-15 PROCEDURE — 99172 OCULAR FUNCTION SCREEN: CPT | Mod: S$GLB,,, | Performed by: STUDENT IN AN ORGANIZED HEALTH CARE EDUCATION/TRAINING PROGRAM

## 2025-06-25 ENCOUNTER — OCCUPATIONAL HEALTH (OUTPATIENT)
Dept: URGENT CARE | Facility: CLINIC | Age: 45
End: 2025-06-25

## 2025-06-25 DIAGNOSIS — Z02.83 ENCOUNTER FOR DRUG SCREENING: Primary | ICD-10-CM

## 2025-06-25 PROCEDURE — 80305 DRUG TEST PRSMV DIR OPT OBS: CPT | Mod: S$GLB,,, | Performed by: EMERGENCY MEDICINE
